# Patient Record
Sex: FEMALE | Race: WHITE
[De-identification: names, ages, dates, MRNs, and addresses within clinical notes are randomized per-mention and may not be internally consistent; named-entity substitution may affect disease eponyms.]

---

## 2020-02-11 ENCOUNTER — HOSPITAL ENCOUNTER (OUTPATIENT)
Dept: HOSPITAL 95 - LAB SHORT | Age: 25
Discharge: HOME | End: 2020-02-11
Attending: OBSTETRICS & GYNECOLOGY
Payer: COMMERCIAL

## 2020-02-11 DIAGNOSIS — Z34.81: Primary | ICD-10-CM

## 2020-02-11 PROCEDURE — G0123 SCREEN CERV/VAG THIN LAYER: HCPCS

## 2020-02-13 LAB — .: (no result)

## 2020-09-08 ENCOUNTER — HOSPITAL ENCOUNTER (INPATIENT)
Dept: HOSPITAL 95 - OBS | Age: 25
LOS: 2 days | Discharge: HOME | End: 2020-09-10
Attending: OBSTETRICS & GYNECOLOGY | Admitting: OBSTETRICS & GYNECOLOGY
Payer: COMMERCIAL

## 2020-09-08 VITALS — HEIGHT: 65 IN | WEIGHT: 250.51 LBS | BODY MASS INDEX: 41.74 KG/M2

## 2020-09-08 DIAGNOSIS — Z3A.39: ICD-10-CM

## 2020-09-08 LAB
BASOPHILS # BLD AUTO: 0.03 K/MM3 (ref 0–0.23)
BASOPHILS NFR BLD AUTO: 0 % (ref 0–2)
DEPRECATED RDW RBC AUTO: 53.2 FL (ref 35.1–46.3)
EOSINOPHIL # BLD AUTO: 0.06 K/MM3 (ref 0–0.68)
EOSINOPHIL NFR BLD AUTO: 1 % (ref 0–6)
ERYTHROCYTE [DISTWIDTH] IN BLOOD BY AUTOMATED COUNT: 16.9 % (ref 11.7–14.2)
HCT VFR BLD AUTO: 36.8 % (ref 33–51)
HGB BLD-MCNC: 12.1 G/DL (ref 11.5–16)
IMM GRANULOCYTES # BLD AUTO: 0.07 K/MM3 (ref 0–0.1)
IMM GRANULOCYTES NFR BLD AUTO: 1 % (ref 0–1)
LYMPHOCYTES # BLD AUTO: 2.32 K/MM3 (ref 0.84–5.2)
LYMPHOCYTES NFR BLD AUTO: 19 % (ref 21–46)
MCHC RBC AUTO-ENTMCNC: 32.9 G/DL (ref 31.5–36.5)
MCV RBC AUTO: 87 FL (ref 80–100)
MONOCYTES # BLD AUTO: 1 K/MM3 (ref 0.16–1.47)
MONOCYTES NFR BLD AUTO: 8 % (ref 4–13)
NEUTROPHILS # BLD AUTO: 8.98 K/MM3 (ref 1.96–9.15)
NEUTROPHILS NFR BLD AUTO: 72 % (ref 41–73)
NRBC # BLD AUTO: 0 K/MM3 (ref 0–0.02)
NRBC BLD AUTO-RTO: 0 /100 WBC (ref 0–0.2)
PLATELET # BLD AUTO: 232 K/MM3 (ref 150–400)

## 2020-09-08 PROCEDURE — A9270 NON-COVERED ITEM OR SERVICE: HCPCS

## 2020-09-09 LAB
BASOPHILS # BLD AUTO: 0.02 K/MM3 (ref 0–0.23)
BASOPHILS NFR BLD AUTO: 0 % (ref 0–2)
DEPRECATED RDW RBC AUTO: 55.2 FL (ref 35.1–46.3)
EOSINOPHIL # BLD AUTO: 0.02 K/MM3 (ref 0–0.68)
EOSINOPHIL NFR BLD AUTO: 0 % (ref 0–6)
ERYTHROCYTE [DISTWIDTH] IN BLOOD BY AUTOMATED COUNT: 17.2 % (ref 11.7–14.2)
HCT VFR BLD AUTO: 33.6 % (ref 33–51)
HGB BLD-MCNC: 10.9 G/DL (ref 11.5–16)
IMM GRANULOCYTES # BLD AUTO: 0.06 K/MM3 (ref 0–0.1)
IMM GRANULOCYTES NFR BLD AUTO: 1 % (ref 0–1)
LYMPHOCYTES # BLD AUTO: 1.92 K/MM3 (ref 0.84–5.2)
LYMPHOCYTES NFR BLD AUTO: 16 % (ref 21–46)
MCHC RBC AUTO-ENTMCNC: 32.4 G/DL (ref 31.5–36.5)
MCV RBC AUTO: 88 FL (ref 80–100)
MONOCYTES # BLD AUTO: 0.92 K/MM3 (ref 0.16–1.47)
MONOCYTES NFR BLD AUTO: 8 % (ref 4–13)
NEUTROPHILS # BLD AUTO: 8.82 K/MM3 (ref 1.96–9.15)
NEUTROPHILS NFR BLD AUTO: 75 % (ref 41–73)
NRBC # BLD AUTO: 0 K/MM3 (ref 0–0.02)
NRBC BLD AUTO-RTO: 0 /100 WBC (ref 0–0.2)
PLATELET # BLD AUTO: 198 K/MM3 (ref 150–400)

## 2020-09-09 NOTE — NUR
0930: RN ROUNDED TO HELP W/ BREASTFEEDING. PT HAS OLDER CHILD SHE DID NOT BRF
BECAUSE OF FEEDING DIFFICULTIES, SHE PUMPED AND BOTTLE FEED FOR 3 MONTHS. PT'S
NIPPLES ARE FLAT AND BREASTS ARE LARGE. INSTRUCT/DEMO CORRECT POSITIONING,
U-HOLD AND TEA CUP HOLD. INSTRUCT/DEMO SHIELD USE AND WEANING. NB NOT SHOWING
INTEREST IN FEEDING AT THIS TIME. INSTRUCT/DEMO HAND EXPRESSION OF COLOSTRUM.
WILL ROUND AGAIN.
 
1210: RN ROUNED TO ATTEMP TO HELP BREASTFEED AGAIN. PT REPORTS THAT NB JUST
FED FROM [id number] TOTALING 20 MINUTES OF ACTUAL FEEDING IN THAT TIME. RN
PRAISED PT AND OFFERED FURTHER ASSISTANCE IF SHE NEEDS. MOM LOVING W/ NB,
DENIES ANY FURTHER QUESTIONS OR CONCERNS.

## 2022-11-16 ENCOUNTER — HOSPITAL ENCOUNTER (OUTPATIENT)
Dept: HOSPITAL 95 - ER | Age: 27
Setting detail: OBSERVATION
LOS: 1 days | Discharge: HOME | End: 2022-11-17
Attending: OBSTETRICS & GYNECOLOGY | Admitting: OBSTETRICS & GYNECOLOGY
Payer: COMMERCIAL

## 2022-11-16 VITALS — WEIGHT: 238.58 LBS | BODY MASS INDEX: 39.75 KG/M2 | HEIGHT: 65 IN

## 2022-11-16 DIAGNOSIS — T83.39XA: ICD-10-CM

## 2022-11-16 DIAGNOSIS — E66.9: ICD-10-CM

## 2022-11-16 DIAGNOSIS — E03.9: ICD-10-CM

## 2022-11-16 DIAGNOSIS — Z88.1: ICD-10-CM

## 2022-11-16 DIAGNOSIS — N83.8: ICD-10-CM

## 2022-11-16 DIAGNOSIS — O00.102: Primary | ICD-10-CM
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
## 2022-11-16 DIAGNOSIS — K66.1: ICD-10-CM

## 2022-11-16 DIAGNOSIS — Z88.7: ICD-10-CM

## 2022-11-16 LAB
ALBUMIN SERPL BCP-MCNC: 3.7 G/DL (ref 3.4–5)
ALBUMIN/GLOB SERPL: 0.8 {RATIO} (ref 0.8–1.8)
ALT SERPL W P-5'-P-CCNC: 21 U/L (ref 12–78)
ANION GAP SERPL CALCULATED.4IONS-SCNC: 8 MMOL/L (ref 6–16)
AST SERPL W P-5'-P-CCNC: 14 U/L (ref 12–37)
BASOPHILS # BLD AUTO: 0.03 K/MM3 (ref 0–0.23)
BASOPHILS NFR BLD AUTO: 0 % (ref 0–2)
BILIRUB SERPL-MCNC: 0.2 MG/DL (ref 0.1–1)
BUN SERPL-MCNC: 18 MG/DL (ref 8–24)
CALCIUM SERPL-MCNC: 9 MG/DL (ref 8.5–10.1)
CHLORIDE SERPL-SCNC: 107 MMOL/L (ref 98–108)
CO2 SERPL-SCNC: 25 MMOL/L (ref 21–32)
CREAT SERPL-MCNC: 0.87 MG/DL (ref 0.4–1)
DEPRECATED RDW RBC AUTO: 41.4 FL (ref 35.1–46.3)
EOSINOPHIL # BLD AUTO: 0.16 K/MM3 (ref 0–0.68)
EOSINOPHIL NFR BLD AUTO: 1 % (ref 0–6)
ERYTHROCYTE [DISTWIDTH] IN BLOOD BY AUTOMATED COUNT: 14 % (ref 11.7–14.2)
GLOBULIN SER CALC-MCNC: 4.4 G/DL (ref 2.2–4)
GLUCOSE SERPL-MCNC: 142 MG/DL (ref 70–99)
HCT VFR BLD AUTO: 34.8 % (ref 33–51)
HGB BLD-MCNC: 11.5 G/DL (ref 11.5–16)
IMM GRANULOCYTES # BLD AUTO: 0.06 K/MM3 (ref 0–0.1)
IMM GRANULOCYTES NFR BLD AUTO: 0 % (ref 0–1)
LYMPHOCYTES # BLD AUTO: 4.95 K/MM3 (ref 0.84–5.2)
LYMPHOCYTES NFR BLD AUTO: 35 % (ref 21–46)
MCHC RBC AUTO-ENTMCNC: 33 G/DL (ref 31.5–36.5)
MCV RBC AUTO: 80 FL (ref 80–100)
MONOCYTES # BLD AUTO: 1.12 K/MM3 (ref 0.16–1.47)
MONOCYTES NFR BLD AUTO: 8 % (ref 4–13)
NEUTROPHILS # BLD AUTO: 7.69 K/MM3 (ref 1.96–9.15)
NEUTROPHILS NFR BLD AUTO: 55 % (ref 41–73)
NRBC # BLD AUTO: 0 K/MM3 (ref 0–0.02)
NRBC BLD AUTO-RTO: 0 /100 WBC (ref 0–0.2)
PLATELET # BLD AUTO: 357 K/MM3 (ref 150–400)
POTASSIUM SERPL-SCNC: 3.6 MMOL/L (ref 3.5–5.5)
PROT SERPL-MCNC: 8.1 G/DL (ref 6.4–8.2)
SODIUM SERPL-SCNC: 140 MMOL/L (ref 136–145)

## 2022-11-16 PROCEDURE — A9270 NON-COVERED ITEM OR SERVICE: HCPCS

## 2022-11-17 LAB — B-HCG SERPL-ACNC: 3626 MIU/ML (ref 0–3)

## 2022-11-17 NOTE — NUR
SHIFT SUMMARY
 
PT ER ADMIT THIS SHIFT FOR RUPTURED ECTOPIC PREGNANCY. PT ADMITTED TO FLOOR,
PRE OP INTERVENTIONS COMPLETED. PT TAKEN TO SURGERY JUST SHORTLY AFTER
ARRIVING TO UNIT. POD 0 L SALPINGECTOMY BY DR. GAINES WITH IUD REMOVAL.
ESTIMATED BLOOD LOSS 600 MLS. THREE LAP SITES WITH STERI STRIPS C/D/I. PT
TOLERATED SURGERY WELL, AND RECOVERED WELL. PT IS TOLERATING PO INTAKE AT THIS
TIME, REQUESTED [facility]IST. VITALS STABLE, PT A/OX4 AND DENIES N/V OR PAIN AT
THS TIME. PT HAS NOT BEEN OUT OF BED OR AMBULATED YET. THERE ARE DISCHARGE
ORDERS FOR TODAY ONCE PT HAS AMBULATED, VOIDED AND IS TOLERATING PO.  BED IN
LOWEST POSITION, CALL LIGHT WITHIN REACH.

## 2022-11-17 NOTE — NUR
A&OX4, C/O 4/10 INCISIONAL PAIN, 1 OXYCODONE GIVEN, DENIES ANY NAUSEA,
ENCOURAGED OOB TO ABMULATE, STATES VOIDING WITHOUT DIFFICULTY, TOLERATED
REGULAR BREAKFAST WELL, PLAN DC THIS AM.